# Patient Record
Sex: MALE | Race: WHITE | NOT HISPANIC OR LATINO | Employment: FULL TIME | URBAN - METROPOLITAN AREA
[De-identification: names, ages, dates, MRNs, and addresses within clinical notes are randomized per-mention and may not be internally consistent; named-entity substitution may affect disease eponyms.]

---

## 2023-01-23 PROBLEM — E66.3 OVERWEIGHT: Status: ACTIVE | Noted: 2023-01-23

## 2023-01-23 PROBLEM — R10.30 GROIN PAIN: Status: ACTIVE | Noted: 2023-01-23

## 2023-01-23 PROBLEM — Z85.828 HISTORY OF BASAL CELL CARCINOMA: Status: ACTIVE | Noted: 2023-01-23

## 2023-01-23 PROBLEM — M54.50 LOW BACK PAIN: Status: ACTIVE | Noted: 2023-01-23

## 2023-01-23 PROBLEM — Z86.718 HISTORY OF DEEP VEIN THROMBOSIS: Status: ACTIVE | Noted: 2023-01-23

## 2023-01-23 PROBLEM — N13.8 BPH WITH URINARY OBSTRUCTION: Status: ACTIVE | Noted: 2023-01-23

## 2023-01-23 PROBLEM — R97.20 ELEVATED PSA: Status: ACTIVE | Noted: 2023-01-23

## 2023-01-23 PROBLEM — I45.10 INCOMPLETE RIGHT BUNDLE BRANCH BLOCK: Status: ACTIVE | Noted: 2023-01-23

## 2023-01-23 PROBLEM — B35.1 ONYCHOMYCOSIS OF TOENAIL: Status: ACTIVE | Noted: 2023-01-23

## 2023-01-23 PROBLEM — D07.5: Status: ACTIVE | Noted: 2023-01-23

## 2023-01-23 PROBLEM — M54.30 SCIATICA: Status: ACTIVE | Noted: 2023-01-23

## 2023-01-23 PROBLEM — N40.1 BPH WITH URINARY OBSTRUCTION: Status: ACTIVE | Noted: 2023-01-23

## 2023-01-23 RX ORDER — TADALAFIL 5 MG/1
1 TABLET ORAL DAILY
COMMUNITY
Start: 2022-02-10 | End: 2023-03-22

## 2023-01-23 RX ORDER — NAPROXEN SODIUM 220 MG
2 TABLET ORAL DAILY
COMMUNITY
Start: 2020-02-17 | End: 2023-03-22 | Stop reason: SDUPTHER

## 2023-03-22 ENCOUNTER — OFFICE VISIT (OUTPATIENT)
Dept: PRIMARY CARE | Facility: CLINIC | Age: 58
End: 2023-03-22
Payer: COMMERCIAL

## 2023-03-22 VITALS
BODY MASS INDEX: 29.6 KG/M2 | WEIGHT: 211.4 LBS | HEART RATE: 51 BPM | DIASTOLIC BLOOD PRESSURE: 60 MMHG | SYSTOLIC BLOOD PRESSURE: 102 MMHG | HEIGHT: 71 IN | TEMPERATURE: 97.8 F

## 2023-03-22 DIAGNOSIS — Z85.828 HISTORY OF BASAL CELL CARCINOMA: ICD-10-CM

## 2023-03-22 DIAGNOSIS — E66.3 OVERWEIGHT: ICD-10-CM

## 2023-03-22 DIAGNOSIS — Z86.718 HISTORY OF DEEP VEIN THROMBOSIS: ICD-10-CM

## 2023-03-22 DIAGNOSIS — Z00.00 PREVENTATIVE HEALTH CARE: Primary | ICD-10-CM

## 2023-03-22 PROCEDURE — 99396 PREV VISIT EST AGE 40-64: CPT | Performed by: INTERNAL MEDICINE

## 2023-03-22 RX ORDER — NAPROXEN SODIUM 220 MG
440 TABLET ORAL EVERY 12 HOURS PRN
Start: 2023-03-22 | End: 2024-03-27 | Stop reason: ALTCHOICE

## 2023-03-22 ASSESSMENT — PAIN SCALES - GENERAL: PAINLEVEL: 4

## 2023-03-22 NOTE — PROGRESS NOTES
Subjective   Patient ID: Trent Azevedo is a 58 y.o. male who presents for Annual Exam (CPE).  HPI  iN FOR ANNUAL WELLNESS EVALUATION.  NO acute complaints  Working out 2/week with weights and riding twice weekly.  No recurrent COVID since 2020, November.  Suggest annual influenza vaccine.  [place] UTD  PSA q 6 months with Dr. White.  Current Outpatient Medications on File Prior to Visit   Medication Sig Dispense Refill    naproxen sodium (Aleve) 220 mg tablet Take 2 tablets (440 mg) by mouth 1 (one) time each day.      tadalafil (Cialis) 5 mg tablet Take 1 tablet (5 mg) by mouth 1 (one) time each day.       No current facility-administered medications on file prior to visit.      Review of Systems  General: Denies weakness, fever, anorexia. Denies significant change in weight.  HEENT: Denies HA, vsion change or problem, hearing loss or tinnitus, voice or swallowing problems.  Resp: Denies SOB, cough, or wheezing.  CV: Denies chest pain or pressure, palpitations, syncope, edema, or claudication.  GI : Denies abdominal pain, diarrhea, constipation, heartburn, rectal bleeding, or change in bowel habits.  : Denies urinary frequency, pain, blood, incontinence, or nocturia.  Sexual: No sexual health or reproductive system concerns.  MSK: Denies significant musculoskeletal pains or limitations EXCEPT AS FOLLOWS...some muscular pain after work outs.  Neuro: Denies tingling, numbness, weakness, tremor, balance problems, falls, or memory loss.  Psych: Denies Mood or sleep issues.  Derm: No unusual lesions, moles or rashes. Annual derm evaluations.    Objective   Physical Exam  Constitutional: Alert and in no acute distress  Inspection of Eyes: Sclera and conjunctivae normal.  Pupil exam: PERRL. EOMI.  Ophthalmoscopy normal disks and vessels.  Tympanic membranes are normal. External ears appear normal.   Oropharynx: Normal with MMM.   Neck: Thyroid normal. No cervical lymphadenopathy. No carotid bruit.  No cervical,  axillary, or inguinal lymphadenopathy.  Breasts: No masses.   Lungs are clear to auscultation and percussion.  Cardiac: S1 and S2 are normal. No murmurs, rubs, or gallops. No edema.   Abdomen: Soft, nontender. Normal bowel sounds. No hepatosplenomegaly or masses.  Musculoskeletal: Examination of gait is normal. No clubbing or cyanosis. Full range of motion of joints. NO swelling, tenderness, or limitation of motion.  Skin normal skin color and pigmentation. No visible rash. Nml skin turgor.  Neurological: Cranial nerves II-XII intact. Deep tendon reflexes 2+ and symmetric .No focal motor weakness. Sensation and vibration are normal. No pronator drift. Coordination normal. Balance normal.  Psychiatric: A&Ox3. Mood and affect normal.   No visits with results within 1 Week(s) from this visit.   Latest known visit with results is:   [facility] Encounter on 09/01/2022   Component Date Value Ref Range Status    PSA 09/01/2022 3.6  0.0 - 4.0 ng/mL Final    Comment: INTERPRETIVE INFORMATION: Prostate Specific Antigen  The Roche PSA electrochemiluminescent immunoassay is used. Results   obtained with different test methods or kits cannot be used   interchangeably. The Roche PSA method is approved for use as an   aid in the detection of prostate cancer when used in conjunction   with a digital rectal exam in individuals with a prostate age 50   years and older. The Roche PSA is also indicated for the serial   measurement of PSA to aid in the prognosis and management of   prostate cancer patients. Elevated PSA concentrations can only   suggest the presence of prostate cancer until biopsy is performed.   PSA concentrations can also be elevated in benign prostatic   hyperplasia or inflammatory conditions of the prostate. PSA is   generally not elevated in healthy individuals or individuals with   nonprostatic carcinoma.      PSA, Free 09/01/2022 0.7  ng/mL Final    PSA, Free Pct 09/01/2022 19  % Final    Comment: INTERPRETIVE  INFORMATION: Prostate Specific Antigen, Free                            Percentage  [facility] uses the Roche Free PSA electrochemiluminescent immunoassay   method in conjunction with the Roche PSA electrochemiluminescent   immunoassay method to determine the free PSA percentage. Values   obtained with different assay methods should not be used   interchangeably. The free PSA percentage is an aid in   distinguishing prostate cancer from benign prostatic conditions in   individuals with a prostate age 50 years and older with a total   PSA between 3 and 10 ng/mL and negative digital rectal examination   findings. Prostatic biopsy is required for the diagnosis of   cancer.   In patients with total PSA concentrations of 4-10 ng/mL, the   probability of finding prostate cancer on needle biopsy by age in   years is:  %fPSA               50-59    60-69    70 or older  0  - 10%            49%      58%      65%  11 - 18%            27%      34%      41%  19 - 25%            18%      24%                                 30%  Greater than 25%     9%      12%      16%  Other factors may help determine the actual risk of prostate   cancer in individual patients.  Performed By: [facility]  [address] [place] [id number]  : Vickey Choudhury MD, PhD         Assessment/Plan

## 2023-07-25 LAB — PROSTATE SPECIFIC AG (NG/ML) IN SER/PLAS: 4.55 NG/ML (ref 0–4)

## 2024-01-04 ENCOUNTER — LAB (OUTPATIENT)
Dept: LAB | Facility: LAB | Age: 59
End: 2024-01-04
Payer: COMMERCIAL

## 2024-01-04 DIAGNOSIS — R97.20 ELEVATED PROSTATE SPECIFIC ANTIGEN (PSA): Primary | ICD-10-CM

## 2024-01-04 LAB — PSA SERPL-MCNC: 4.35 NG/ML

## 2024-01-04 PROCEDURE — 36415 COLL VENOUS BLD VENIPUNCTURE: CPT

## 2024-01-04 PROCEDURE — 84153 ASSAY OF PSA TOTAL: CPT

## 2024-01-08 ENCOUNTER — OFFICE VISIT (OUTPATIENT)
Dept: UROLOGY | Facility: HOSPITAL | Age: 59
End: 2024-01-08
Payer: COMMERCIAL

## 2024-01-08 DIAGNOSIS — N13.8 BPH WITH URINARY OBSTRUCTION: Primary | ICD-10-CM

## 2024-01-08 DIAGNOSIS — D07.5 PROSTATIC INTRAEPITHELIAL NEOPLASIA III: ICD-10-CM

## 2024-01-08 DIAGNOSIS — R97.20 ELEVATED PSA: ICD-10-CM

## 2024-01-08 DIAGNOSIS — N40.1 BPH WITH URINARY OBSTRUCTION: Primary | ICD-10-CM

## 2024-01-08 PROCEDURE — 51798 US URINE CAPACITY MEASURE: CPT | Performed by: NURSE PRACTITIONER

## 2024-01-08 PROCEDURE — 99213 OFFICE O/P EST LOW 20 MIN: CPT | Performed by: NURSE PRACTITIONER

## 2024-01-08 NOTE — PROGRESS NOTES
Subjective   Patient ID: Trent Azevedo is a 58 y.o. male FUV     HPI  Patient presenting for FUV. Last visit with Dr. White 07/2022. Family history of prostate cancer (maternal grandfather, maternal uncle, and father Dx in 60's) and personal history of elevated PSA with PI-RADS 3 lesion on MRI 08/12/2020 and HGPIN on biopsy 11/12/2020. PSA was 3.6 total with 19% free on 9/1/22 and 3.3 total and 21% free on 01/11/2022.    Reports he has NTF x1-2x. He is not taking the daily dosing Cialis regularly. NTF has improved with sleep behavior modified. He has no bothersome urinary symptoms. No ED concerns. No straining to empty, stream strong.   Patient did not leave urine sample. Voided prior to appt.   PVR 47 ml     Lab Results   Component Value Date    PSA 4.35 (H) 01/04/2024    PSA 4.55 (H) 07/25/2023    PSA 3.6 09/01/2022    PSA 3.3 01/11/2022    PSA 2.49 06/01/2021    PSA 5.22 (H) 02/25/2020     Review of Systems  All other systems have been reviewed and are negative for complaint.    Objective   Physical Exam  General: Well developed, well nourished, alert and cooperative, appears in no acute distress  Eyes: Non-injected conjunctiva, sclera clear, no proptosis  Cardiac: Extremities are warm and well perfused. No edema, cyanosis or pallor.   Lungs: Breathing is easy, non-labored. Speaking in clear and complete sentences. Normal diaphragmatic movement.  MSK: Ambulatory with steady gait, unassisted  Neuro: alert and oriented to person, place and time  Psych: Demonstrates good judgement and reason, without hallucinations, abnormal affect or abnormal behaviors.  Skin: no obvious lesions, no rashes.    Current Outpatient Medications on File Prior to Visit   Medication Sig Dispense Refill    naproxen sodium (Aleve) 220 mg tablet Take 2 tablets (440 mg) by mouth every 12 hours if needed for mild pain (1 - 3).       No current facility-administered medications on file prior to visit.     Past Surgical History:    Procedure Laterality Date    HERNIA REPAIR  04/29/2016    Hernia Repair    OTHER SURGICAL HISTORY  12/08/2017    Hip Arthroscopy With Debridement/Shaving Of Articular Cartil    TONSILLECTOMY  04/03/2014    Tonsillectomy    VASECTOMY  04/03/2014    Surgery Vas Deferens Vasectomy         Assessment/Plan   Diagnoses and all orders for this visit:  BPH with urinary obstruction  Elevated PSA  Prostatic intraepithelial neoplasia III    PI-RADS 3 lesion on MRI 08/12/2020 and HGPIN on biopsy 11/12/2020. PSA was 3.6 total with 19% free on 9/1/22 and 3.3 total and 21% free on 01/11/2022.    Today we discussed Family history of prostate cancer and PSA as a tool to screen gentleman for prostate cancer. We discussed that many factors can elevate the PSA, and when the PSA is elevated further testing is warranted. I explained that while PSA is used for  prostate cancer screening, it is not specific to prostate cancer and can be elevated with benign growth of the prostate. We discussed options of proceeding with repeating MRI, proceeding with transrectal ultrasound prostate biopsy, or continuing to monitor his PSA.     Patient will schedule Prostate MRI and follow up in 4 to 6 weeks with results   He will RTC in 6 months with PSA prior.     All questions and concerns were addressed. Patient verbalizes understanding and has no other questions at this time.     You are able to have email access to your chart. You can sign into [employer] or add the [facility] on your smart phone to review today's visit, laboratory work and imaging.   Caitlin Roper-- JAIME CASSIDY  Office Phone:  [phone]

## 2024-01-22 ENCOUNTER — HOSPITAL ENCOUNTER (OUTPATIENT)
Dept: RADIOLOGY | Facility: HOSPITAL | Age: 59
Discharge: HOME | End: 2024-01-22
Payer: COMMERCIAL

## 2024-01-22 DIAGNOSIS — R97.20 ELEVATED PSA: ICD-10-CM

## 2024-01-26 ENCOUNTER — HOSPITAL ENCOUNTER (OUTPATIENT)
Dept: RADIOLOGY | Facility: HOSPITAL | Age: 59
Discharge: HOME | End: 2024-01-26
Payer: COMMERCIAL

## 2024-01-26 PROCEDURE — 72197 MRI PELVIS W/O & W/DYE: CPT | Performed by: STUDENT IN AN ORGANIZED HEALTH CARE EDUCATION/TRAINING PROGRAM

## 2024-01-26 PROCEDURE — A9575 INJ GADOTERATE MEGLUMI 0.1ML: HCPCS | Performed by: NURSE PRACTITIONER

## 2024-01-26 PROCEDURE — 72197 MRI PELVIS W/O & W/DYE: CPT

## 2024-01-26 PROCEDURE — 76498 UNLISTED MR PROCEDURE: CPT | Performed by: STUDENT IN AN ORGANIZED HEALTH CARE EDUCATION/TRAINING PROGRAM

## 2024-01-26 PROCEDURE — 2550000001 HC RX 255 CONTRASTS: Performed by: NURSE PRACTITIONER

## 2024-01-26 RX ORDER — GADOTERATE MEGLUMINE 376.9 MG/ML
19 INJECTION INTRAVENOUS
Status: COMPLETED | OUTPATIENT
Start: 2024-01-26 | End: 2024-01-26

## 2024-01-26 RX ADMIN — GADOTERATE MEGLUMINE 19 ML: 376.9 INJECTION INTRAVENOUS at 08:29

## 2024-02-11 NOTE — PROGRESS NOTES
[facility]  Outpatient Clinic Note    Subjective   Trent zAevedo is a 58 y.o. male FUV review imaging     History of Present Illness   P/atient presenting for FUV. Last visit with myself Visit with Dr. White 07/2022. Family history of prostate cancer (maternal grandfather, maternal uncle, and father Dx in 60's) and personal history of elevated PSA with PI-RADS 3 l esion on MRI 08/12/2020 and HGPIN on biopsy 11/12/2020. PSA was 3.6 total with 19% free on 9/1/22 and 3.3 total and 21% free on 01/11/2022.     Reports he has NTF x1-2x. He is not taking the daily dosing Cialis regularly. NTF has improved with sleep behavior modified. He has no bothersome urinary symptoms. No ED concerns. No straining to empty, stream strong.     PI-RADS 3 lesion on MRI 08/12/2020 and HGPIN on biopsy 11/12/2020. PSA was 3.6 total with 19% free on 9/1/22 and 3.3 total and 21% free on 01/11/2022.    I personally reviewed Prostate MRI  01/30/2024 Prostate MRI demonstrates   1. PI-RADS 3 lesion in the right peripheral zone at mid gland.  2. Changes of benign prostatic hyperplasia.      PI-RADS 3 - Intermediate (the presence of clinically significant  cancer is equivocal).    Prostate weight is estimated at 100.08g     Lab Results   Component Value Date    PSA 4.35 (H) 01/04/2024    PSA 4.55 (H) 07/25/2023    PSA 3.6 09/01/2022    PSA 3.3 01/11/2022    PSA 2.49 06/01/2021    PSA 5.22 (H) 02/25/2020        Past Medical History and Surgical History   Past Medical History:   Diagnosis Date    Personal history of other malignant neoplasm of skin 03/07/2022    History of basal cell carcinoma    Personal history of other venous thrombosis and embolism 03/01/2021    History of deep venous thrombosis    Unilateral primary osteoarthritis, right hip 04/29/2016    Osteoarthritis of right hip     Past Surgical History:   Procedure Laterality Date    HERNIA REPAIR  04/29/2016    Hernia Repair    OTHER SURGICAL HISTORY  12/08/2017    Hip  Arthroscopy With Debridement/Shaving Of Articular Cartil    TONSILLECTOMY  04/03/2014    Tonsillectomy    VASECTOMY  04/03/2014    Surgery Vas Deferens Vasectomy       Medications  Current Outpatient Medications on File Prior to Visit   Medication Sig Dispense Refill    naproxen sodium (Aleve) 220 mg tablet Take 2 tablets (440 mg) by mouth every 12 hours if needed for mild pain (1 - 3).       No current facility-administered medications on file prior to visit.       Objective   Physicial Exam  General: Well developed, well nourished, alert and cooperative, appears in no acute distress  Eyes: Non-injected conjunctiva, sclera clear, no proptosis  Cardiac: Extremities are warm and well perfused. No edema, cyanosis or pallor.   Lungs: Breathing is easy, non-labored. Speaking in clear and complete sentences. Normal diaphragmatic movement.  MSK: Ambulatory with steady gait, unassisted  Neuro: alert and oriented to person, place and time  Psych: Demonstrates good judgement and reason, without hallucinations, abnormal affect or abnormal behaviors.  Skin: no obvious lesions, no rashes.    Review of Systems  All other systems have been reviewed and are negative for complaint.      Assessment and Plan   Today we discussed Family history of prostate cancer and PSA as a tool to screen gentleman for prostate cancer. We discussed that many factors can elevate the PSA, and when the PSA is elevated further testing is warranted. I explained that while PSA is used for  prostate cancer screening, it is not specific to prostate cancer and can be elevated with benign growth of the prostate.     01/30/2024 Prostate MRI demonstrates   1. PI-RADS 3 lesion in the right peripheral zone at mid gland.  2. Changes of benign prostatic hyperplasia.      PI-RADS 3 - Intermediate (the presence of clinically significant  cancer is equivocal).    Prostate weight is estimated at 100.08g     Patient will follow up in 6 months with PSA prior    All  questions and concerns were addressed. Patient verbalizes understanding and has no other questions at this time.   You are able to have email access to your chart. You can sign into [employer] or add the [facility] marcelino on your smart phone to review today's visit, laboratory work and imaging.   If you have any questions about your care, do not hesitate to call and leave a message, we return calls in a timely manner.    Caitlin Roper-- JAIME CASSIDY  Office Phone:  [phone]

## 2024-02-12 ENCOUNTER — OFFICE VISIT (OUTPATIENT)
Dept: UROLOGY | Facility: HOSPITAL | Age: 59
End: 2024-02-12
Payer: COMMERCIAL

## 2024-02-12 DIAGNOSIS — R97.20 ELEVATED PSA: Primary | ICD-10-CM

## 2024-02-12 DIAGNOSIS — N40.1 BPH WITH URINARY OBSTRUCTION: ICD-10-CM

## 2024-02-12 DIAGNOSIS — N13.8 BPH WITH URINARY OBSTRUCTION: ICD-10-CM

## 2024-02-12 LAB
POC APPEARANCE, URINE: CLEAR
POC BILIRUBIN, URINE: NEGATIVE
POC BLOOD, URINE: NEGATIVE
POC COLOR, URINE: YELLOW
POC GLUCOSE, URINE: NEGATIVE MG/DL
POC KETONES, URINE: NEGATIVE MG/DL
POC LEUKOCYTES, URINE: NEGATIVE
POC NITRITE,URINE: NEGATIVE
POC PH, URINE: 6 PH
POC PROTEIN, URINE: NEGATIVE MG/DL
POC SPECIFIC GRAVITY, URINE: 1.02
POC UROBILINOGEN, URINE: 0.2 EU/DL

## 2024-02-12 PROCEDURE — 99213 OFFICE O/P EST LOW 20 MIN: CPT | Performed by: NURSE PRACTITIONER

## 2024-02-12 PROCEDURE — 51798 US URINE CAPACITY MEASURE: CPT | Performed by: NURSE PRACTITIONER

## 2024-02-12 PROCEDURE — 81003 URINALYSIS AUTO W/O SCOPE: CPT | Mod: 91,QW | Performed by: NURSE PRACTITIONER

## 2024-03-27 ENCOUNTER — OFFICE VISIT (OUTPATIENT)
Dept: PRIMARY CARE | Facility: CLINIC | Age: 59
End: 2024-03-27
Payer: COMMERCIAL

## 2024-03-27 ENCOUNTER — LAB (OUTPATIENT)
Dept: LAB | Facility: LAB | Age: 59
End: 2024-03-27
Payer: COMMERCIAL

## 2024-03-27 VITALS
SYSTOLIC BLOOD PRESSURE: 104 MMHG | HEART RATE: 66 BPM | BODY MASS INDEX: 27.89 KG/M2 | DIASTOLIC BLOOD PRESSURE: 64 MMHG | TEMPERATURE: 98.1 F | WEIGHT: 200 LBS

## 2024-03-27 DIAGNOSIS — Z00.00 PREVENTATIVE HEALTH CARE: ICD-10-CM

## 2024-03-27 DIAGNOSIS — Z00.00 PREVENTATIVE HEALTH CARE: Primary | ICD-10-CM

## 2024-03-27 PROBLEM — M54.50 LOW BACK PAIN: Status: RESOLVED | Noted: 2023-01-23 | Resolved: 2024-03-27

## 2024-03-27 PROBLEM — R10.30 GROIN PAIN: Status: RESOLVED | Noted: 2023-01-23 | Resolved: 2024-03-27

## 2024-03-27 PROBLEM — M54.30 SCIATICA: Status: RESOLVED | Noted: 2023-01-23 | Resolved: 2024-03-27
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
## 2024-03-27 PROBLEM — S22.080A COMPRESSION FRACTURE OF T11 VERTEBRA (MULTI): Status: ACTIVE | Noted: 2023-05-20
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
## 2024-03-27 PROBLEM — S22.080A COMPRESSION FRACTURE OF T11 VERTEBRA (MULTI): Status: RESOLVED | Noted: 2023-05-20 | Resolved: 2024-03-27

## 2024-03-27 LAB
ALBUMIN SERPL BCP-MCNC: 4.3 G/DL (ref 3.4–5)
ALP SERPL-CCNC: 56 U/L (ref 33–120)
ALT SERPL W P-5'-P-CCNC: 20 U/L (ref 10–52)
ANION GAP SERPL CALC-SCNC: 14 MMOL/L (ref 10–20)
AST SERPL W P-5'-P-CCNC: 20 U/L (ref 9–39)
BILIRUB SERPL-MCNC: 0.9 MG/DL (ref 0–1.2)
BUN SERPL-MCNC: 15 MG/DL (ref 6–23)
CALCIUM SERPL-MCNC: 9.6 MG/DL (ref 8.6–10.6)
CHLORIDE SERPL-SCNC: 105 MMOL/L (ref 98–107)
CHOLEST SERPL-MCNC: 182 MG/DL (ref 0–199)
CHOLESTEROL/HDL RATIO: 3.3
CO2 SERPL-SCNC: 27 MMOL/L (ref 21–32)
CREAT SERPL-MCNC: 0.98 MG/DL (ref 0.5–1.3)
EGFRCR SERPLBLD CKD-EPI 2021: 89 ML/MIN/1.73M*2
GLUCOSE SERPL-MCNC: 99 MG/DL (ref 74–99)
HDLC SERPL-MCNC: 54.6 MG/DL
LDLC SERPL CALC-MCNC: 113 MG/DL
NON HDL CHOLESTEROL: 127 MG/DL (ref 0–149)
POTASSIUM SERPL-SCNC: 4.4 MMOL/L (ref 3.5–5.3)
PROT SERPL-MCNC: 6.6 G/DL (ref 6.4–8.2)
SODIUM SERPL-SCNC: 142 MMOL/L (ref 136–145)
TRIGL SERPL-MCNC: 71 MG/DL (ref 0–149)
VLDL: 14 MG/DL (ref 0–40)

## 2024-03-27 PROCEDURE — 80061 LIPID PANEL: CPT

## 2024-03-27 PROCEDURE — 1036F TOBACCO NON-USER: CPT | Performed by: INTERNAL MEDICINE

## 2024-03-27 PROCEDURE — 80053 COMPREHEN METABOLIC PANEL: CPT

## 2024-03-27 PROCEDURE — 36415 COLL VENOUS BLD VENIPUNCTURE: CPT

## 2024-03-27 PROCEDURE — 99396 PREV VISIT EST AGE 40-64: CPT | Performed by: INTERNAL MEDICINE

## 2024-03-27 ASSESSMENT — PAIN SCALES - GENERAL: PAINLEVEL: 0-NO PAIN
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
## 2024-03-27 NOTE — ASSESSMENT & PLAN NOTE
Colonoscopy UTD.  Prostate cancer screening is UTD.  BP is normal.  Assess chemistries and hematology.

## 2024-07-03 ENCOUNTER — LAB (OUTPATIENT)
Dept: LAB | Facility: LAB | Age: 59
End: 2024-07-03
Payer: COMMERCIAL

## 2024-07-03 DIAGNOSIS — D07.5 PROSTATIC INTRAEPITHELIAL NEOPLASIA III: ICD-10-CM

## 2024-07-03 DIAGNOSIS — R97.20 ELEVATED PSA: ICD-10-CM

## 2024-07-03 LAB — PSA SERPL-MCNC: 4.43 NG/ML

## 2024-07-03 PROCEDURE — 84153 ASSAY OF PSA TOTAL: CPT

## 2024-07-03 PROCEDURE — 36415 COLL VENOUS BLD VENIPUNCTURE: CPT

## 2024-07-08 ENCOUNTER — TELEPHONE (OUTPATIENT)
Dept: UROLOGY | Facility: HOSPITAL | Age: 59
End: 2024-07-08

## 2024-07-08 ENCOUNTER — APPOINTMENT (OUTPATIENT)
Dept: UROLOGY | Facility: CLINIC | Age: 59
End: 2024-07-08
Payer: COMMERCIAL

## 2024-07-08 NOTE — TELEPHONE ENCOUNTER
Left message for pt to call [phone].  Appt on 7/11 must be virtual only or needs to be rescheduled/mm

## 2024-07-11 ENCOUNTER — TELEMEDICINE (OUTPATIENT)
Dept: UROLOGY | Facility: HOSPITAL | Age: 59
End: 2024-07-11
Payer: COMMERCIAL

## 2024-07-11 DIAGNOSIS — R97.20 ELEVATED PSA: ICD-10-CM

## 2024-07-11 DIAGNOSIS — D07.5 PROSTATIC INTRAEPITHELIAL NEOPLASIA III: Primary | ICD-10-CM

## 2024-07-11 PROCEDURE — 1036F TOBACCO NON-USER: CPT | Performed by: NURSE PRACTITIONER

## 2024-07-11 PROCEDURE — 99213 OFFICE O/P EST LOW 20 MIN: CPT | Performed by: NURSE PRACTITIONER
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
## 2024-09-05 ENCOUNTER — APPOINTMENT (OUTPATIENT)
Dept: DERMATOLOGY | Facility: CLINIC | Age: 59
End: 2024-09-05
Payer: COMMERCIAL

## 2024-09-05 DIAGNOSIS — L57.8 DIFFUSE PHOTODAMAGE OF SKIN: ICD-10-CM

## 2024-09-05 DIAGNOSIS — L73.9 FOLLICULITIS: ICD-10-CM

## 2024-09-05 DIAGNOSIS — L23.89 OTHER ALLERGIC CONTACT DERMATITIS: Primary | ICD-10-CM

## 2024-09-05 DIAGNOSIS — L82.1 SEBORRHEIC KERATOSIS: ICD-10-CM

## 2024-09-05 DIAGNOSIS — D18.01 HEMANGIOMA OF SKIN: ICD-10-CM

## 2024-09-05 DIAGNOSIS — D22.5 MELANOCYTIC NEVUS OF TRUNK: ICD-10-CM

## 2024-09-05 DIAGNOSIS — Z86.018 HISTORY OF DYSPLASTIC NEVUS: ICD-10-CM

## 2024-09-05 DIAGNOSIS — D48.5 NEOPLASM OF UNCERTAIN BEHAVIOR OF SKIN: ICD-10-CM

## 2024-09-05 PROCEDURE — 99214 OFFICE O/P EST MOD 30 MIN: CPT | Performed by: DERMATOLOGY

## 2024-09-05 PROCEDURE — 11301 SHAVE SKIN LESION 0.6-1.0 CM: CPT | Performed by: DERMATOLOGY

## 2024-09-05 RX ORDER — TRIAMCINOLONE ACETONIDE 1 MG/G
CREAM TOPICAL
Qty: 80 G | Refills: 1 | Status: SHIPPED | OUTPATIENT
Start: 2024-09-05

## 2024-09-05 RX ORDER — CLINDAMYCIN PHOSPHATE 10 UG/ML
LOTION TOPICAL 2 TIMES DAILY
Qty: 60 ML | Refills: 11 | Status: SHIPPED | OUTPATIENT
Start: 2024-09-05 | End: 2025-09-05

## 2024-09-05 ASSESSMENT — DERMATOLOGY PATIENT ASSESSMENT
DO YOU HAVE ANY NEW OR CHANGING LESIONS: NO
DO YOU USE SUNSCREEN: OCCASIONALLY
DO YOU USE A TANNING BED: NO
ARE YOU AN ORGAN TRANSPLANT RECIPIENT: NO

## 2024-09-05 ASSESSMENT — DERMATOLOGY QUALITY OF LIFE (QOL) ASSESSMENT: ARE THERE EXCLUSIONS OR EXCEPTIONS FOR THE QUALITY OF LIFE ASSESSMENT: NO

## 2024-09-10 LAB
LABORATORY COMMENT REPORT: NORMAL
PATH REPORT.FINAL DX SPEC: NORMAL
PATH REPORT.GROSS SPEC: NORMAL
PATH REPORT.MICROSCOPIC SPEC OTHER STN: NORMAL
PATH REPORT.RELEVANT HX SPEC: NORMAL
PATH REPORT.TOTAL CANCER: NORMAL

## 2024-11-04 ENCOUNTER — APPOINTMENT (OUTPATIENT)
Dept: DERMATOLOGY | Facility: CLINIC | Age: 59
End: 2024-11-04
Payer: COMMERCIAL

## 2024-11-04 DIAGNOSIS — D48.5 NEOPLASM OF UNCERTAIN BEHAVIOR OF SKIN: Primary | ICD-10-CM

## 2024-11-04 DIAGNOSIS — L82.1 SEBORRHEIC KERATOSIS: ICD-10-CM

## 2024-11-04 DIAGNOSIS — D18.01 HEMANGIOMA OF SKIN: ICD-10-CM

## 2024-11-04 DIAGNOSIS — L57.8 DIFFUSE PHOTODAMAGE OF SKIN: ICD-10-CM

## 2024-11-04 DIAGNOSIS — L73.9 FOLLICULITIS: ICD-10-CM

## 2024-11-04 DIAGNOSIS — D22.4 MELANOCYTIC NEVUS OF NECK: ICD-10-CM

## 2024-11-04 PROCEDURE — 99213 OFFICE O/P EST LOW 20 MIN: CPT | Performed by: DERMATOLOGY

## 2024-11-04 PROCEDURE — 11401 EXC TR-EXT B9+MARG 0.6-1 CM: CPT | Performed by: DERMATOLOGY

## 2024-11-04 ASSESSMENT — ITCH NUMERIC RATING SCALE: HOW SEVERE IS YOUR ITCHING?: 0

## 2024-11-04 ASSESSMENT — DERMATOLOGY PATIENT ASSESSMENT
DO YOU HAVE ANY NEW OR CHANGING LESIONS: NO
DO YOU USE SUNSCREEN: OCCASIONALLY
DO YOU USE A TANNING BED: NO

## 2024-11-04 ASSESSMENT — DERMATOLOGY QUALITY OF LIFE (QOL) ASSESSMENT: ARE THERE EXCLUSIONS OR EXCEPTIONS FOR THE QUALITY OF LIFE ASSESSMENT: NO
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
## 2024-11-21 ENCOUNTER — APPOINTMENT (OUTPATIENT)
Dept: DERMATOLOGY | Facility: CLINIC | Age: 59
End: 2024-11-21
Payer: COMMERCIAL

## 2024-11-21 DIAGNOSIS — Z48.02 ENCOUNTER FOR REMOVAL OF SUTURES: Primary | ICD-10-CM

## 2024-11-21 PROCEDURE — 99024 POSTOP FOLLOW-UP VISIT: CPT | Performed by: DERMATOLOGY

## 2024-11-21 NOTE — PROGRESS NOTES
Trent HUTCHISON Jolly is a 59 y.o. male  Pt here for suture removal,sutures intact .Location of the sutures are left lateral upper back . Incision well approximated and well granulated without signs of infections. Incision cleaned with alcohol. Sutures removed without incident. Pt. Instructed to call clinic with increase redness,pain,swelling or drainage.

## 2024-12-30 ENCOUNTER — LAB (OUTPATIENT)
Dept: LAB | Facility: LAB | Age: 59
End: 2024-12-30
Payer: COMMERCIAL

## 2024-12-30 DIAGNOSIS — R97.20 ELEVATED PSA: ICD-10-CM

## 2024-12-30 DIAGNOSIS — D07.5 PROSTATIC INTRAEPITHELIAL NEOPLASIA III: ICD-10-CM

## 2024-12-30 PROCEDURE — 84153 ASSAY OF PSA TOTAL: CPT

## 2024-12-30 PROCEDURE — 84154 ASSAY OF PSA FREE: CPT

## 2024-12-31 LAB
PSA FREE MFR SERPL: 15 %
PSA FREE SERPL-MCNC: 0.8 NG/ML
PSA SERPL IA-MCNC: 5.2 NG/ML (ref 0–4)

## 2025-01-13 ENCOUNTER — APPOINTMENT (OUTPATIENT)
Dept: UROLOGY | Facility: HOSPITAL | Age: 60
End: 2025-01-13
Payer: COMMERCIAL
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
## 2025-01-28 ENCOUNTER — OFFICE VISIT (OUTPATIENT)
Dept: UROLOGY | Facility: HOSPITAL | Age: 60
End: 2025-01-28
Payer: COMMERCIAL

## 2025-01-28 DIAGNOSIS — D07.5 PROSTATIC INTRAEPITHELIAL NEOPLASIA III: Primary | ICD-10-CM

## 2025-01-28 DIAGNOSIS — N40.1 BENIGN PROSTATIC HYPERPLASIA WITH URINARY OBSTRUCTION: ICD-10-CM

## 2025-01-28 DIAGNOSIS — Z20.2 POSSIBLE EXPOSURE TO STI: ICD-10-CM

## 2025-01-28 DIAGNOSIS — R97.20 ELEVATED PSA: ICD-10-CM

## 2025-01-28 DIAGNOSIS — N13.8 BENIGN PROSTATIC HYPERPLASIA WITH URINARY OBSTRUCTION: ICD-10-CM

## 2025-01-28 LAB
POC APPEARANCE, URINE: CLEAR
POC BILIRUBIN, URINE: NEGATIVE
POC BLOOD, URINE: NEGATIVE
POC COLOR, URINE: YELLOW
POC GLUCOSE, URINE: NEGATIVE MG/DL
POC KETONES, URINE: NEGATIVE MG/DL
POC LEUKOCYTES, URINE: NEGATIVE
POC NITRITE,URINE: NEGATIVE
POC PH, URINE: 6.5 PH
POC PROTEIN, URINE: NEGATIVE MG/DL
POC SPECIFIC GRAVITY, URINE: 1.02
POC UROBILINOGEN, URINE: 0.2 EU/DL

## 2025-01-28 PROCEDURE — G2211 COMPLEX E/M VISIT ADD ON: HCPCS | Performed by: NURSE PRACTITIONER

## 2025-01-28 PROCEDURE — 99214 OFFICE O/P EST MOD 30 MIN: CPT | Mod: 25 | Performed by: NURSE PRACTITIONER

## 2025-01-28 PROCEDURE — 99214 OFFICE O/P EST MOD 30 MIN: CPT | Performed by: NURSE PRACTITIONER

## 2025-01-28 PROCEDURE — 51798 US URINE CAPACITY MEASURE: CPT | Performed by: NURSE PRACTITIONER

## 2025-01-28 PROCEDURE — 1036F TOBACCO NON-USER: CPT | Performed by: NURSE PRACTITIONER

## 2025-01-28 PROCEDURE — 81003 URINALYSIS AUTO W/O SCOPE: CPT | Mod: QW | Performed by: NURSE PRACTITIONER

## 2025-01-29 LAB — PSA SERPL-MCNC: 8.79 NG/ML

## 2025-01-31 LAB
HBV SURFACE AB SERPL IA-ACNC: <5 MIU/ML
HBV SURFACE AG SERPL QL IA: NORMAL
HCV AB SERPL QL IA: NORMAL
HIV 1+2 AB+HIV1 P24 AG SERPL QL IA: NORMAL
T PALLIDUM AB SER QL IA: NEGATIVE

## 2025-02-03 ENCOUNTER — TELEPHONE (OUTPATIENT)
Dept: UROLOGY | Facility: HOSPITAL | Age: 60
End: 2025-02-03
Payer: COMMERCIAL

## 2025-02-03 DIAGNOSIS — R97.20 ELEVATED PSA: Primary | ICD-10-CM

## 2025-02-03 DIAGNOSIS — Z20.2 POSSIBLE EXPOSURE TO STI: ICD-10-CM

## 2025-02-03 NOTE — TELEPHONE ENCOUNTER
Called patient to discuss PSA results. He is currently out of town and was started on antibiotics for UTI. He would like further testing for penile discharge if it continues after completing antibiotics. Discussed that his PSA could also be elevated due to current infection. We will plan to repeat PSA 2 weeks after completing antibiotics. Patient agreeable to plan. Order for ureaplasma/mycoplasma ordered for penile discharge, repeat PSA order placed. Patient will call our office with any questions or concerns.    ANGE Dillard-CNP  [facility] [place]  2/3/2025 4:27 PM

## 2025-02-24 DIAGNOSIS — R97.20 ELEVATED PSA: ICD-10-CM

## 2025-03-07 LAB — PSA SERPL-MCNC: 4.43 NG/ML
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
## 2025-03-20 ENCOUNTER — TELEMEDICINE (OUTPATIENT)
Dept: UROLOGY | Facility: HOSPITAL | Age: 60
End: 2025-03-20
Payer: COMMERCIAL

## 2025-03-20 DIAGNOSIS — R97.20 ELEVATED PSA: ICD-10-CM

## 2025-03-20 DIAGNOSIS — Z87.440 HISTORY OF UTI: ICD-10-CM

## 2025-03-20 PROCEDURE — G2211 COMPLEX E/M VISIT ADD ON: HCPCS | Performed by: UROLOGY

## 2025-03-20 PROCEDURE — 99214 OFFICE O/P EST MOD 30 MIN: CPT | Performed by: UROLOGY

## 2025-03-27 ENCOUNTER — TELEPHONE (OUTPATIENT)
Dept: UROLOGY | Facility: HOSPITAL | Age: 60
End: 2025-03-27
Payer: COMMERCIAL

## 2025-03-27 NOTE — TELEPHONE ENCOUNTER
I left Mr Azevedo a voicemail regarding scheduling his Ct and Cysto, I gave him the main scheduling line.

## 2025-03-31 ENCOUNTER — APPOINTMENT (OUTPATIENT)
Dept: PRIMARY CARE | Facility: CLINIC | Age: 60
End: 2025-03-31
Payer: COMMERCIAL

## 2025-03-31 VITALS
HEIGHT: 73 IN | SYSTOLIC BLOOD PRESSURE: 107 MMHG | WEIGHT: 201.2 LBS | BODY MASS INDEX: 26.66 KG/M2 | TEMPERATURE: 97.7 F | HEART RATE: 54 BPM | DIASTOLIC BLOOD PRESSURE: 57 MMHG

## 2025-03-31 DIAGNOSIS — N40.1 BPH WITH URINARY OBSTRUCTION: ICD-10-CM

## 2025-03-31 DIAGNOSIS — Z00.00 HEALTHCARE MAINTENANCE: Primary | ICD-10-CM

## 2025-03-31 DIAGNOSIS — N13.8 BPH WITH URINARY OBSTRUCTION: ICD-10-CM

## 2025-03-31 PROCEDURE — 3008F BODY MASS INDEX DOCD: CPT | Performed by: STUDENT IN AN ORGANIZED HEALTH CARE EDUCATION/TRAINING PROGRAM

## 2025-03-31 PROCEDURE — 90471 IMMUNIZATION ADMIN: CPT | Performed by: STUDENT IN AN ORGANIZED HEALTH CARE EDUCATION/TRAINING PROGRAM

## 2025-03-31 PROCEDURE — 90677 PCV20 VACCINE IM: CPT | Performed by: STUDENT IN AN ORGANIZED HEALTH CARE EDUCATION/TRAINING PROGRAM

## 2025-03-31 PROCEDURE — 99396 PREV VISIT EST AGE 40-64: CPT | Performed by: STUDENT IN AN ORGANIZED HEALTH CARE EDUCATION/TRAINING PROGRAM

## 2025-03-31 PROCEDURE — 1036F TOBACCO NON-USER: CPT | Performed by: STUDENT IN AN ORGANIZED HEALTH CARE EDUCATION/TRAINING PROGRAM

## 2025-03-31 NOTE — PATIENT INSTRUCTIONS
Please stop at any  lab ([address] if you choose to use my building) to complete your blood and/or urine work that I've ordered for you.    I will contact you with the results at my soonest convenience. I strongly urge you to use [facility] as this is the quickest and easiest way to access your results and receive my correspondences.     You received your pneumonia (PCV 20) shot today!     Follow up with your specialists as previously scheduled.     See me yearly for a complete physical exam, and sooner as needed for sick visits
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
## 2025-04-01 LAB
ALBUMIN SERPL-MCNC: 4.3 G/DL (ref 3.6–5.1)
ALP SERPL-CCNC: 64 U/L (ref 35–144)
ALT SERPL-CCNC: 13 U/L (ref 9–46)
ANION GAP SERPL CALCULATED.4IONS-SCNC: 7 MMOL/L (CALC) (ref 7–17)
AST SERPL-CCNC: 14 U/L (ref 10–35)
BASOPHILS # BLD AUTO: 68 CELLS/UL (ref 0–200)
BASOPHILS NFR BLD AUTO: 1.2 %
BILIRUB SERPL-MCNC: 1.3 MG/DL (ref 0.2–1.2)
BUN SERPL-MCNC: 15 MG/DL (ref 7–25)
CALCIUM SERPL-MCNC: 9.2 MG/DL (ref 8.6–10.3)
CHLORIDE SERPL-SCNC: 104 MMOL/L (ref 98–110)
CHOLEST SERPL-MCNC: 177 MG/DL
CHOLEST/HDLC SERPL: 3.2 (CALC)
CO2 SERPL-SCNC: 29 MMOL/L (ref 20–32)
CREAT SERPL-MCNC: 0.94 MG/DL (ref 0.7–1.35)
EGFRCR SERPLBLD CKD-EPI 2021: 93 ML/MIN/1.73M2
EOSINOPHIL # BLD AUTO: 228 CELLS/UL (ref 15–500)
EOSINOPHIL NFR BLD AUTO: 4 %
ERYTHROCYTE [DISTWIDTH] IN BLOOD BY AUTOMATED COUNT: 12.4 % (ref 11–15)
GLUCOSE SERPL-MCNC: 93 MG/DL (ref 65–99)
HCT VFR BLD AUTO: 43.9 % (ref 38.5–50)
HDLC SERPL-MCNC: 55 MG/DL
HGB BLD-MCNC: 15.4 G/DL (ref 13.2–17.1)
LDLC SERPL CALC-MCNC: 107 MG/DL (CALC)
LYMPHOCYTES # BLD AUTO: 1562 CELLS/UL (ref 850–3900)
LYMPHOCYTES NFR BLD AUTO: 27.4 %
MCH RBC QN AUTO: 31.6 PG (ref 27–33)
MCHC RBC AUTO-ENTMCNC: 35.1 G/DL (ref 32–36)
MCV RBC AUTO: 90 FL (ref 80–100)
MONOCYTES # BLD AUTO: 399 CELLS/UL (ref 200–950)
MONOCYTES NFR BLD AUTO: 7 %
NEUTROPHILS # BLD AUTO: 3443 CELLS/UL (ref 1500–7800)
NEUTROPHILS NFR BLD AUTO: 60.4 %
NONHDLC SERPL-MCNC: 122 MG/DL (CALC)
PLATELET # BLD AUTO: 260 THOUSAND/UL (ref 140–400)
PMV BLD REES-ECKER: 9.9 FL (ref 7.5–12.5)
POTASSIUM SERPL-SCNC: 4.4 MMOL/L (ref 3.5–5.3)
PROT SERPL-MCNC: 6.5 G/DL (ref 6.1–8.1)
RBC # BLD AUTO: 4.88 MILLION/UL (ref 4.2–5.8)
SODIUM SERPL-SCNC: 140 MMOL/L (ref 135–146)
TRIGL SERPL-MCNC: 63 MG/DL
WBC # BLD AUTO: 5.7 THOUSAND/UL (ref 3.8–10.8)

## 2025-06-06 DIAGNOSIS — R97.20 ELEVATED PSA: ICD-10-CM

## 2025-07-29 ENCOUNTER — APPOINTMENT (OUTPATIENT)
Dept: UROLOGY | Facility: HOSPITAL | Age: 60
End: 2025-07-29
Payer: COMMERCIAL

## 2025-09-08 ENCOUNTER — APPOINTMENT (OUTPATIENT)
Dept: DERMATOLOGY | Facility: CLINIC | Age: 60
End: 2025-09-08
Payer: COMMERCIAL

## 2026-04-02 ENCOUNTER — APPOINTMENT (OUTPATIENT)
Dept: PRIMARY CARE | Facility: CLINIC | Age: 61
End: 2026-04-02
Payer: COMMERCIAL